# Patient Record
Sex: MALE | Race: WHITE | NOT HISPANIC OR LATINO | Employment: OTHER | URBAN - NONMETROPOLITAN AREA
[De-identification: names, ages, dates, MRNs, and addresses within clinical notes are randomized per-mention and may not be internally consistent; named-entity substitution may affect disease eponyms.]

---

## 2017-01-17 ENCOUNTER — TELEPHONE (OUTPATIENT)
Dept: OTOLARYNGOLOGY | Facility: CLINIC | Age: 82
End: 2017-01-17

## 2017-01-17 NOTE — TELEPHONE ENCOUNTER
Patient's wife called this am requesting appointment for patient. I returned his call this afternoon to offer appointment tomorrow 01/18/2017  Patient states his wife his sick and he is in the emergency room at [facility] with her at this time and he will call us back at another time to reschedule his appointment. Patient has sore area on ear that needs to be checked.

## 2017-01-23 ENCOUNTER — OFFICE VISIT (OUTPATIENT)
Dept: OTOLARYNGOLOGY | Facility: CLINIC | Age: 82
End: 2017-01-23

## 2017-01-23 VITALS
TEMPERATURE: 97.8 F | DIASTOLIC BLOOD PRESSURE: 88 MMHG | HEART RATE: 66 BPM | WEIGHT: 200 LBS | BODY MASS INDEX: 28.63 KG/M2 | SYSTOLIC BLOOD PRESSURE: 151 MMHG | RESPIRATION RATE: 20 BRPM | HEIGHT: 70 IN

## 2017-01-23 DIAGNOSIS — C44.219 BASAL CELL CARCINOMA OF LEFT EAR: Primary | ICD-10-CM

## 2017-01-23 PROCEDURE — 99214 OFFICE O/P EST MOD 30 MIN: CPT | Performed by: OTOLARYNGOLOGY

## 2017-01-23 RX ORDER — INFLUENZA A VIRUS A/MICHIGAN/45/2015 X-275 (H1N1) ANTIGEN (FORMALDEHYDE INACTIVATED), INFLUENZA A VIRUS A/SINGAPORE/INFIMH-16-0019/2016 IVR-186 (H3N2) ANTIGEN (FORMALDEHYDE INACTIVATED), AND INFLUENZA B VIRUS B/MARYLAND/15/2016 BX-69A (A B/COLORADO/6/2017-LIKE VIRUS) ANTIGEN (FORMALDEHYDE INACTIVATED) 60; 60; 60 UG/.5ML; UG/.5ML; UG/.5ML
INJECTION, SUSPENSION INTRAMUSCULAR
COMMUNITY
Start: 2016-10-21 | End: 2017-05-15

## 2017-01-23 RX ORDER — TIMOLOL MALEATE 5 MG/ML
1 SOLUTION/ DROPS OPHTHALMIC 2 TIMES DAILY
COMMUNITY
Start: 2016-12-07

## 2017-01-23 RX ORDER — ATORVASTATIN CALCIUM 20 MG/1
20 TABLET, FILM COATED ORAL DAILY
COMMUNITY
Start: 2016-12-06

## 2017-01-23 RX ORDER — LATANOPROST 50 UG/ML
1 SOLUTION/ DROPS OPHTHALMIC NIGHTLY
COMMUNITY
Start: 2016-12-06

## 2017-01-23 RX ORDER — LISINOPRIL AND HYDROCHLOROTHIAZIDE 20; 12.5 MG/1; MG/1
1 TABLET ORAL DAILY
COMMUNITY
Start: 2016-12-06

## 2017-01-23 RX ORDER — AMLODIPINE BESYLATE 5 MG/1
5 TABLET ORAL 2 TIMES DAILY
COMMUNITY
Start: 2016-12-06

## 2017-01-23 NOTE — PROGRESS NOTES
History of Present Illness  Anjum Peng complains of a skin lesion of the left posterior auricle . The lesion has been present for 6 months. The lesion has has changed. Symptoms associated with the lesion are: bleeding, tendency to be traumatized, pain, won't heal. Patient denies darkening color, itching, drainage, infection.    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 The new skin lesion is adjacent to the previous excision.     He states that a biopsy has not been performed.     Past Medical History   Diagnosis Date   • Basal cell carcinoma    • Basal cell carcinoma of left ear    • Heart attack    • Hypercholesteremia    • Hypertension      Past Surgical History   Procedure Laterality Date   • Basal cell carcinoma excision     • Cardiac surgery       bypass   • Partial hip arthroplasty     • Nose surgery       History reviewed. No pertinent family history.  Social History   Substance Use Topics   • Smoking status: Never Smoker   • Smokeless tobacco: None   • Alcohol use No     Allergies:  Review of patient's allergies indicates no known allergies.    Current Outpatient Prescriptions:   •  amLODIPine (NORVASC) 5 MG tablet, , Disp: , Rfl:   •  atorvastatin (LIPITOR) 20 MG tablet, , Disp: , Rfl:   •  FLUZONE HIGH-DOSE 0.5 ML suspension prefilled syringe injection, , Disp: , Rfl:   •  latanoprost (XALATAN) 0.005 % ophthalmic solution, , Disp: , Rfl:   •  lisinopril-hydrochlorothiazide (PRINZIDE,ZESTORETIC) 20-12.5 MG per tablet, , Disp: , Rfl:   •  timolol (TIMOPTIC) 0.5 % ophthalmic solution, , Disp: , Rfl:     Review of Systems   Constitutional: Negative for activity change, appetite change, chills, fatigue, fever and unexpected weight change.   HENT: Negative for congestion, dental problem, facial swelling and nosebleeds.    Eyes: Negative for discharge and redness.   Skin: Negative for color change, pallor and rash.   Hematological: Negative  "for adenopathy. Does not bruise/bleed easily.   All other systems reviewed and are negative.      Visit Vitals   • /88   • Pulse 66   • Temp 97.8 °F (36.6 °C)   • Resp 20   • Ht 70\" (177.8 cm)   • Wt 200 lb (90.7 kg)   • BMI 28.7 kg/m2       Physical Exam   Constitutional: He is oriented to person, place, and time. He appears well-developed and well-nourished. He is cooperative. No distress.   HENT:   Head: Normocephalic and atraumatic.   Right Ear: External ear normal.   Left Ear: External ear normal.   Nose: Nose normal.   Mouth/Throat: Oropharynx is clear and moist.   Eyes: Conjunctivae and EOM are normal. Pupils are equal, round, and reactive to light. Right eye exhibits no discharge. Left eye exhibits no discharge. No scleral icterus.   Neck: Normal range of motion and phonation normal. Neck supple. No tracheal deviation present.   Pulmonary/Chest: Effort normal. No stridor. No respiratory distress.   Musculoskeletal: Normal range of motion. He exhibits no edema or deformity.   Lymphadenopathy:     He has no cervical adenopathy.   Neurological: He is alert and oriented to person, place, and time. He has normal strength. No cranial nerve deficit. Coordination normal.   Skin: Skin is warm and dry. Lesion (erythematous lesion on the left posterior auricle-approximately 1.3 x 0.5 cm with small ulceration and scab) noted. No rash noted. He is not diaphoretic. No erythema. No pallor.   Psychiatric: He has a normal mood and affect. His speech is normal and behavior is normal. Judgment and thought content normal. Cognition and memory are normal.   Nursing note and vitals reviewed.            Anjum was seen today for follow-up.    Diagnoses and all orders for this visit:    Basal cell carcinoma of left ear  -     Skin Excision; Future      Photographs were taken today. Postoperative care instructions were given.     I do not feel this is a recurrence as it is not involving previous scar, but it is adjacent to it. "     Discussion of skin lesion. Discussed risks, benefits, alternatives, and possible complications of excision of the skin lesion with reconstruction utilizing local tissue rearrangement, full-thickness skin grafting, or local interpolated flaps. Risks include, but are not limited too: bleeding, infection, hematoma, recurrence, need for additional procedures, flap failure, cosmetic deformity. Patient understands risks and would like to proceed with surgery.     I have recommended excision of the carcinoma with frozen section analysis and reconstruction with linear closure in the office under local anesthesia.      Return for Postoperatively.    Jose Daniel Morel MD   01/23/17  12:18 PM

## 2017-01-23 NOTE — LETTER
January 23, 2017     Lucía Nick MD  [address] [facility] [id number]    Patient: Anjum Peng   YOB: 1935   Date of Visit: 1/23/2017       Dear Dr. Sandoval MD:    Thank you for referring Anjum Peng to me for evaluation. Below are the relevant portions of my assessment and plan of care.    If you have questions, please do not hesitate to call me. I look forward to following Anjum along with you.         Sincerely,        Jose Daniel Morel MD        CC: No Recipients  Jose Daniel Morel MD  1/23/2017  1:17 PM  Signed  History of Present Illness  Anjum Peng complains of a skin lesion of the left posterior auricle . The lesion has been present for 6 months. The lesion has has changed. Symptoms associated with the lesion are: bleeding, tendency to be traumatized, pain, won't heal. Patient denies darkening color, itching, drainage, infection.    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 The new skin lesion is adjacent to the previous excision.     He states that a biopsy has not been performed.     Past Medical History   Diagnosis Date   • Basal cell carcinoma    • Basal cell carcinoma of left ear    • Heart attack    • Hypercholesteremia    • Hypertension      Past Surgical History   Procedure Laterality Date   • Basal cell carcinoma excision     • Cardiac surgery       bypass   • Partial hip arthroplasty     • Nose surgery       History reviewed. No pertinent family history.  Social History   Substance Use Topics   • Smoking status: Never Smoker   • Smokeless tobacco: None   • Alcohol use No     Allergies:  Review of patient's allergies indicates no known allergies.    Current Outpatient Prescriptions:   •  amLODIPine (NORVASC) 5 MG tablet, , Disp: , Rfl:   •  atorvastatin (LIPITOR) 20 MG tablet, , Disp: , Rfl:   •  FLUZONE HIGH-DOSE 0.5 ML suspension prefilled syringe injection, , Disp: , Rfl:   •  latanoprost (XALATAN) 0.005 %  "ophthalmic solution, , Disp: , Rfl:   •  lisinopril-hydrochlorothiazide (PRINZIDE,ZESTORETIC) 20-12.5 MG per tablet, , Disp: , Rfl:   •  timolol (TIMOPTIC) 0.5 % ophthalmic solution, , Disp: , Rfl:     Review of Systems   Constitutional: Negative for activity change, appetite change, chills, fatigue, fever and unexpected weight change.   HENT: Negative for congestion, dental problem, facial swelling and nosebleeds.    Eyes: Negative for discharge and redness.   Skin: Negative for color change, pallor and rash.   Hematological: Negative for adenopathy. Does not bruise/bleed easily.   All other systems reviewed and are negative.      Visit Vitals   • /88   • Pulse 66   • Temp 97.8 °F (36.6 °C)   • Resp 20   • Ht 70\" (177.8 cm)   • Wt 200 lb (90.7 kg)   • BMI 28.7 kg/m2       Physical Exam   Constitutional: He is oriented to person, place, and time. He appears well-developed and well-nourished. He is cooperative. No distress.   HENT:   Head: Normocephalic and atraumatic.   Right Ear: External ear normal.   Left Ear: External ear normal.   Nose: Nose normal.   Mouth/Throat: Oropharynx is clear and moist.   Eyes: Conjunctivae and EOM are normal. Pupils are equal, round, and reactive to light. Right eye exhibits no discharge. Left eye exhibits no discharge. No scleral icterus.   Neck: Normal range of motion and phonation normal. Neck supple. No tracheal deviation present.   Pulmonary/Chest: Effort normal. No stridor. No respiratory distress.   Musculoskeletal: Normal range of motion. He exhibits no edema or deformity.   Lymphadenopathy:     He has no cervical adenopathy.   Neurological: He is alert and oriented to person, place, and time. He has normal strength. No cranial nerve deficit. Coordination normal.   Skin: Skin is warm and dry. Lesion (erythematous lesion on the left posterior auricle-approximately 1.3 x 0.5 cm with small ulceration and scab) noted. No rash noted. He is not diaphoretic. No erythema. No " pallor.   Psychiatric: He has a normal mood and affect. His speech is normal and behavior is normal. Judgment and thought content normal. Cognition and memory are normal.   Nursing note and vitals reviewed.            Anjum was seen today for follow-up.    Diagnoses and all orders for this visit:    Basal cell carcinoma of left ear  -     Skin Excision; Future      Photographs were taken today. Postoperative care instructions were given.     I do not feel this is a recurrence as it is not involving previous scar, but it is adjacent to it.     Discussion of skin lesion. Discussed risks, benefits, alternatives, and possible complications of excision of the skin lesion with reconstruction utilizing local tissue rearrangement, full-thickness skin grafting, or local interpolated flaps. Risks include, but are not limited too: bleeding, infection, hematoma, recurrence, need for additional procedures, flap failure, cosmetic deformity. Patient understands risks and would like to proceed with surgery.     I have recommended excision of the carcinoma with frozen section analysis and reconstruction with linear closure in the office under local anesthesia.      Return for Postoperatively.    Jose Daniel Morel MD   01/23/17  12:18 PM

## 2017-03-13 ENCOUNTER — PROCEDURE VISIT (OUTPATIENT)
Dept: OTOLARYNGOLOGY | Facility: CLINIC | Age: 82
End: 2017-03-13

## 2017-03-13 VITALS
HEART RATE: 82 BPM | HEIGHT: 70 IN | SYSTOLIC BLOOD PRESSURE: 145 MMHG | BODY MASS INDEX: 28.63 KG/M2 | TEMPERATURE: 97.8 F | WEIGHT: 200 LBS | RESPIRATION RATE: 20 BRPM | DIASTOLIC BLOOD PRESSURE: 76 MMHG

## 2017-03-13 DIAGNOSIS — C44.219: Primary | ICD-10-CM

## 2017-03-13 PROBLEM — C44.211 BASAL CELL CARCINOMA OF AURICLE OF EAR: Status: ACTIVE | Noted: 2017-03-13

## 2017-03-13 PROCEDURE — 88331 PATH CONSLTJ SURG 1 BLK 1SPC: CPT | Performed by: OTOLARYNGOLOGY

## 2017-03-13 PROCEDURE — 15260 FTH/GFT FR N/E/E/L 20 SQCM/<: CPT | Performed by: OTOLARYNGOLOGY

## 2017-03-13 PROCEDURE — 88305 TISSUE EXAM BY PATHOLOGIST: CPT | Performed by: OTOLARYNGOLOGY

## 2017-03-13 PROCEDURE — 11643 EXC F/E/E/N/L MAL+MRG 2.1-3: CPT | Performed by: OTOLARYNGOLOGY

## 2017-03-13 RX ORDER — CEPHALEXIN 500 MG/1
500 CAPSULE ORAL 4 TIMES DAILY
Qty: 28 CAPSULE | Refills: 0 | Status: SHIPPED | OUTPATIENT
Start: 2017-03-13 | End: 2017-05-15

## 2017-03-13 NOTE — PROGRESS NOTES
Preprocedure diagnosis: Basal cell carcinoma left posterior auricle     Post procedure diagnosis: Basal cell carcinoma left posterior auricle    Procedure performed: 1) Excision malignant neoplasm of skin of left posterior auricle, 2.3 cm x 2.3 cm     2) Full-thickness skin graft (left postauricular crease to left posterior auricle) 2.3cm x 2.3 cm     Surgeon: Jose Daniel Morel M.D.    Anesthesia: Local with 2 cc of 1% lidocaine with 1:100,000 epinephrine    Specimen:  Suspicious for recurrent basal cell carcinoma of the left ear - stitch at 12 o'clock     Complications: None    Disposition: Home    Details: After patient verification and informed consent was obtained, the skin was prepped with alcohol and infiltrated with 1% lidocaine with 1:100,000 epinephrine.  After approximately 10-15 minutes the skin was tested for anesthesia.  The skin was then sterilely prepped with Hibiclens and the patient was sterilely draped.    The skin surrounding the lesion was incised in fusiform fashion with a 15 blade beveling the incision laterally to facilitate wound eversion.  The skin was grasped and the lesion was removed from the underlying tissue utilizing sharp dissection with the 15 blade.  The specimen was oriented with a stitch at 12 o'clock and sent for frozen section analysis.  Hemostasis was obtained with bipolar cautery set at 12.      Frozen section demonstrated BCC with scar and likely superficial basal cell at the 9 o'clock margin.    An additonal margin was taken at 9 o'clock and oriented with a stitch at 9.  Frozen section demonstrated clear margins.    A skin graft was harvested from the left postauricular crease with a fresh 15 blade by incising the skin in fusiform fashion and undermining in the immediate subcutaneous plane, obtaining hemastasis, and releasing the surrounding skin to allow closure (1-2 cm in each direction).    The surrounding skin was undermined in the subcutaneous plane for approximate 1 cm  in each direction utilizing a fresh 15 blade.  Hemostasis was again obtained with bipolar cautery set at 12.  The tissue was advanced and closed utilizing 4-0 undyed Vicryl suture in buried fashion, followed by running, locking 5-0 fast absorbing gut suture.      The skin graft was thinned, trimmed, and inset with 4-0 silk sutures, leaving the suture long for the bolster.  I then rehan a running 5-0 fast absorbing gut along the periphery of the graft.  A bolster was affixed consisting of xeroform and bacitracin ointment soaked cotton.    Antibiotic ointment was placed to the incision.      The patient tolerated the procedure without any complication.

## 2017-03-14 LAB
CYTO UR: NORMAL
CYTO UR: NORMAL
LAB AP CASE REPORT: NORMAL
LAB AP CASE REPORT: NORMAL
LAB AP CLINICAL INFORMATION: NORMAL
LAB AP CLINICAL INFORMATION: NORMAL
Lab: NORMAL
PATH REPORT.FINAL DX SPEC: NORMAL
PATH REPORT.FINAL DX SPEC: NORMAL
PATH REPORT.GROSS SPEC: NORMAL
PATH REPORT.GROSS SPEC: NORMAL

## 2017-03-20 ENCOUNTER — OFFICE VISIT (OUTPATIENT)
Dept: OTOLARYNGOLOGY | Facility: CLINIC | Age: 82
End: 2017-03-20

## 2017-03-20 VITALS
HEART RATE: 60 BPM | RESPIRATION RATE: 20 BRPM | WEIGHT: 201 LBS | DIASTOLIC BLOOD PRESSURE: 85 MMHG | HEIGHT: 70 IN | SYSTOLIC BLOOD PRESSURE: 142 MMHG | TEMPERATURE: 98 F | BODY MASS INDEX: 28.77 KG/M2

## 2017-03-20 DIAGNOSIS — C44.219: Primary | ICD-10-CM

## 2017-03-20 PROCEDURE — 99024 POSTOP FOLLOW-UP VISIT: CPT | Performed by: OTOLARYNGOLOGY

## 2017-03-20 NOTE — PROGRESS NOTES
Anjum Peng presents for a routine postoperative visit following excision of a basal cell carcinoma of the left posterior auricle with full-thickness skin graft on 03/13/17.     Subjective: Since surgery, he is doing well without complaints.  Symptoms denied postoperatively include fever, chills, bleeding and drainage. The patient states the pain has decreased since surgery.     Objective:   Pathology review: Pathology is available. Pathology demonstrates a diagnosis of basal cell carcinoma with clear margins.         Physical Exam:  Left posterior auricle graft pink and healthy- bolster removed. Incisions healing well- sutures removed.       Assessment:  Anjum was seen today for post-op.    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 May briefly get graft wet in the shower. Do not rub, only pat dry.       Return in about 2 weeks (around 4/3/2017), or if symptoms worsen or fail to improve, for Recheck.      Jose Daniel Morel MD  03/20/17  12:04 PM

## 2017-04-03 ENCOUNTER — OFFICE VISIT (OUTPATIENT)
Dept: OTOLARYNGOLOGY | Facility: CLINIC | Age: 82
End: 2017-04-03

## 2017-04-03 VITALS
WEIGHT: 201 LBS | RESPIRATION RATE: 20 BRPM | DIASTOLIC BLOOD PRESSURE: 78 MMHG | HEIGHT: 71 IN | SYSTOLIC BLOOD PRESSURE: 154 MMHG | BODY MASS INDEX: 28.14 KG/M2 | HEART RATE: 59 BPM | TEMPERATURE: 97.8 F

## 2017-04-03 DIAGNOSIS — L57.0 ACTINIC KERATOSIS: ICD-10-CM

## 2017-04-03 DIAGNOSIS — C44.219 BASAL CELL CARCINOMA OF LEFT EAR: Primary | ICD-10-CM

## 2017-04-03 PROCEDURE — 99213 OFFICE O/P EST LOW 20 MIN: CPT | Performed by: OTOLARYNGOLOGY

## 2017-04-03 NOTE — PROGRESS NOTES
Anjum Peng presents for a routine postoperative visit following excision of a basal cell carcinoma of the left posterior auricle with full-thickness skin graft on 03/13/17.     Subjective: Since surgery, he is doing well without complaints.  Symptoms denied postoperatively include fever, chills, bleeding and drainage. The patient states the pain has decreased since surgery.     Objective:   Pathology review: Pathology is available. Pathology demonstrates a diagnosis of basal cell carcinoma with clear margins.     He also complains of a skin lesion of the left auricular lobule. The lesion has been present for a few months. Symptoms associated with the lesion are: waxes and wanes, rough, scaling. Patient denies darkening color, itching, bleeding, pain, drainage, infection. Nothing makes the lesion better or worse. A biopsy has not been performed.             Physical Exam:  Left posterior auricle graft healing well- central crusting removed. Left auricle lobule with crusting c/w AK      Assessment:  Anjum was seen today for post-op.    Diagnoses and all orders for this visit:    Basal cell carcinoma of left ear    Actinic keratosis        Plan:    Continue to apply vaseline. He may get the FTSG wet in the shower. Discussed options of treating AK on the left auricular lobule- he declines any treatment today because he has a wedding coming up. He does see Dr. Alfonso regularly. Follow-up in 6 weeks.    Return in about 6 weeks (around 5/15/2017), or if symptoms worsen or fail to improve, for Recheck.      Jose Daniel Morel MD  04/03/17  9:49 AM

## 2017-05-15 ENCOUNTER — OFFICE VISIT (OUTPATIENT)
Dept: OTOLARYNGOLOGY | Facility: CLINIC | Age: 82
End: 2017-05-15

## 2017-05-15 VITALS
BODY MASS INDEX: 27.86 KG/M2 | WEIGHT: 199 LBS | RESPIRATION RATE: 20 BRPM | HEIGHT: 71 IN | TEMPERATURE: 97.2 F | DIASTOLIC BLOOD PRESSURE: 74 MMHG | SYSTOLIC BLOOD PRESSURE: 135 MMHG | HEART RATE: 66 BPM

## 2017-05-15 DIAGNOSIS — L57.0 ACTINIC KERATOSIS: ICD-10-CM

## 2017-05-15 DIAGNOSIS — C44.219: Primary | ICD-10-CM

## 2017-05-15 PROCEDURE — 99024 POSTOP FOLLOW-UP VISIT: CPT | Performed by: OTOLARYNGOLOGY

## 2018-10-05 ENCOUNTER — OFFICE VISIT (OUTPATIENT)
Dept: CARDIOLOGY | Facility: CLINIC | Age: 83
End: 2018-10-05

## 2018-10-05 VITALS
RESPIRATION RATE: 14 BRPM | OXYGEN SATURATION: 95 % | DIASTOLIC BLOOD PRESSURE: 64 MMHG | SYSTOLIC BLOOD PRESSURE: 130 MMHG | HEIGHT: 71 IN | HEART RATE: 62 BPM | WEIGHT: 192.6 LBS | BODY MASS INDEX: 26.96 KG/M2

## 2018-10-05 DIAGNOSIS — I10 ESSENTIAL HYPERTENSION: ICD-10-CM

## 2018-10-05 DIAGNOSIS — I48.0 PAROXYSMAL ATRIAL FIBRILLATION (HCC): ICD-10-CM

## 2018-10-05 DIAGNOSIS — I25.10 CORONARY ARTERIOSCLEROSIS: Primary | ICD-10-CM

## 2018-10-05 DIAGNOSIS — I47.29 NONSUSTAINED VENTRICULAR TACHYCARDIA (HCC): ICD-10-CM

## 2018-10-05 DIAGNOSIS — E78.00 HYPERCHOLESTEREMIA: ICD-10-CM

## 2018-10-05 PROBLEM — I48.91 ATRIAL FIBRILLATION (HCC): Status: ACTIVE | Noted: 2018-10-05

## 2018-10-05 PROCEDURE — 93000 ELECTROCARDIOGRAM COMPLETE: CPT | Performed by: NURSE PRACTITIONER

## 2018-10-05 PROCEDURE — 99204 OFFICE O/P NEW MOD 45 MIN: CPT | Performed by: NURSE PRACTITIONER

## 2018-10-05 NOTE — ASSESSMENT & PLAN NOTE
Low burden. Continue anticoagulation but increase dose to 5 mg twice a day since creatinine 1.34 and wt greater than 60 kg. Call if palpitations worsen. Discussed need to decrease Eliquis dose if Cr greater than 1.5. Discussed risks of bleeding vs risk of stroke. Check nuclear stress test. May need to check echocardiogram as well.    [id number] 4 (High risk 4%/year)  HAS-BLED 3 (High risk 4.9-19.6%/year)

## 2018-10-05 NOTE — ASSESSMENT & PLAN NOTE
Check nuclear stress test. Has been 10 years since last ischemic evaluation. Encouraged him to start exercising again after stress test results known. Continue medications and healthy diet.

## 2018-10-05 NOTE — PROGRESS NOTES
Subjective:     Encounter Date:10/05/2018    Chief Complaint:    Patient ID: Anjum Peng is a 83 y.o. male here today for cardiac evaluation at the request of Do Turner NP and to re-establish cardiac care. He was previously followed in [place]. He is accompanied by his wife who is a retired oncology/outpatient surgery RN.    Atrial Fibrillation   Presents for initial visit. The condition has lasted for 1 month. Symptoms include chest pain (shooting pains), hypertension and palpitations. Symptoms are negative for dizziness, shortness of breath and weakness. The symptoms have been stable. Past treatments include anticoagulant. Past medical history includes atrial fibrillation, CABG/stent, CAD, HTN and hyperlipidemia. There is no history of CHF.   Coronary Artery Disease   Presents for initial visit. The disease course has been fluctuating. Symptoms include chest pain (shooting pains), leg swelling and palpitations. Pertinent negatives include no dizziness or shortness of breath. Risk factors include hyperlipidemia and hypertension. Past treatments include ACE inhibitors and statins. The treatment provided significant relief. Compliance with prior treatments has been good. His past medical history is significant for past myocardial infarction. There is no history of CHF. Past surgical history includes CABG. Past surgical history does not include angioplasty or cardiac stents. (His last cardiac stress test was approx 2008)     HPI     Atrial Fibrillation    Additional comments: He denies feeling palpitations and flutterings but had 10 seconds of atrial fibrillation on recent Holter           Nonsustained Ventricular Tachycardia    Additional comments: 3 beats on recent Holter monitor           Coronary Artery Disease    Additional comments: s/p MI 1977, CABG 1996, occasional chest discomfort like a shooting pain since having MI at rest and with activity        Last edited by My Stock APRN on  10/5/2018  8:58 AM. 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 3/13/2017   • CKD (chronic kidney disease), stage III (CMS/HCC)    • Cobalamin deficiency    • Coronary artery disease    • Glaucoma    • Heart attack (CMS/HCC) 1977   • Hypercholesteremia    • Hypertension    • Hyperuricemia    • Irregular heartbeat    • Ventricular premature complex    • Vitamin D deficiency      Past Surgical History:   Procedure Laterality Date   • APPENDECTOMY     • BASAL CELL CARCINOMA EXCISION     • CARDIAC CATHETERIZATION     • CARDIAC SURGERY      bypass   • CORONARY ARTERY BYPASS GRAFT  1996    x3    • NOSE SURGERY     • PARTIAL HIP ARTHROPLASTY     • TONSILLECTOMY       Social History     Social History   • Marital status:      Spouse name: N/A   • Number of children: N/A   • Years of education: N/A     Occupational History   • Not on file.     Social History Main Topics   • Smoking status: Former Smoker     Packs/day: 3.00     Types: Cigarettes     Start date: 1953     Quit date: 1977   • Smokeless tobacco: Never Used   • Alcohol use 8.4 oz/week     14 Shots of liquor per week      Comment: 2 a night, vodka   • Drug use: No      Comment: tried    • Sexual activity: Defer     Other Topics Concern   • Not on file     Social History Narrative   • No narrative on file     Family History   Problem Relation Age of Onset   • Hypertension Father    • Heart attack Father    • Heart disease Father    • Heart disease Sister    • Cancer Brother         thyroid   • Heart disease Brother    • Heart disease Brother        Outpatient Prescriptions Marked as Taking for the 10/5/18 encounter (Office Visit) with My Stock APRN   Medication Sig Dispense Refill   • amLODIPine (NORVASC) 5 MG tablet Take 5 mg by mouth 2 (Two) Times a Day.     • atorvastatin (LIPITOR) 20 MG  tablet Take 20 mg by mouth Daily.     • latanoprost (XALATAN) 0.005 % ophthalmic solution Administer 1 drop to both eyes Every Night.     • lisinopril-hydrochlorothiazide (PRINZIDE,ZESTORETIC) 20-12.5 MG per tablet Take 1 tablet by mouth Daily.     • Mirabegron ER (MYRBETRIQ) 50 MG tablet sustained-release 24 hour 24 hr tablet Take 50 mg by mouth Every Other Day.     • omeprazole (priLOSEC) 40 MG capsule Take 40 mg by mouth Daily.     • timolol (TIMOPTIC) 0.5 % ophthalmic solution Administer 1 drop into the left eye 2 (Two) Times a Day.     • [DISCONTINUED] apixaban (ELIQUIS) 2.5 MG tablet tablet Take 2.5 mg by mouth Every 12 (Twelve) Hours.       Review of Systems:  Review of Systems   Constitution: Positive for decreased appetite and malaise/fatigue. Negative for chills, diaphoresis and weakness.   HENT: Negative for congestion and nosebleeds.    Eyes: Negative for blurred vision and discharge.   Cardiovascular: Positive for chest pain (shooting pains), irregular heartbeat, leg swelling and palpitations. Negative for dyspnea on exertion.   Respiratory: Negative for cough and shortness of breath.    Endocrine: Positive for cold intolerance. Negative for heat intolerance.   Hematologic/Lymphatic: Bruises/bleeds easily.   Skin: Negative for dry skin and itching.   Musculoskeletal: Positive for arthritis. Negative for back pain, falls, muscle cramps, neck pain and stiffness.   Gastrointestinal: Positive for diarrhea (IBS). Negative for abdominal pain, constipation, melena, nausea and vomiting.   Genitourinary: Positive for nocturia (4-5). Negative for frequency.   Neurological: Positive for loss of balance. Negative for dizziness, focal weakness, light-headedness and numbness.   Psychiatric/Behavioral: Negative for depression and memory loss. The patient does not have insomnia and is not nervous/anxious.           Objective:   /64 (BP Location: Right arm, Patient Position: Sitting, Cuff Size: Adult)   Pulse 62   " Resp 14   Ht 180.3 cm (71\")   Wt 87.4 kg (192 lb 9.6 oz)   SpO2 95%   BMI 26.86 kg/m²   Wt Readings from Last 3 Encounters:   10/05/18 87.4 kg (192 lb 9.6 oz)   05/15/17 90.3 kg (199 lb)   04/03/17 91.2 kg (201 lb)         Physical Exam   Constitutional: He is oriented to person, place, and time. He appears well-developed and well-nourished.   HENT:   Head: Normocephalic and atraumatic.   Right Ear: External ear normal.   Left Ear: External ear normal.   Nose: Nose normal.   Mouth/Throat: Oropharynx is clear and moist.   Eyes: Pupils are equal, round, and reactive to light. Conjunctivae and EOM are normal. Right eye exhibits no discharge. Left eye exhibits no discharge. No scleral icterus.   Neck: Normal range of motion. Neck supple. No JVD present. No tracheal deviation present. No thyromegaly present.   Cardiovascular: Normal rate and regular rhythm.  Exam reveals no gallop and no friction rub.    No murmur heard.  Pulmonary/Chest: Effort normal and breath sounds normal. He has no wheezes. He has no rales.   Abdominal: Soft. Bowel sounds are normal. He exhibits no mass. There is no tenderness. There is no rebound and no guarding.   Musculoskeletal: He exhibits edema (trace LLE, 1+ pitting RLE). He exhibits no tenderness or deformity.   Lymphadenopathy:     He has no cervical adenopathy.   Neurological: He is alert and oriented to person, place, and time. No cranial nerve deficit.   Skin: Skin is warm and dry.   Mediastinal chest incision well healed, LLE SVG incision well healed   Psychiatric: He has a normal mood and affect. His behavior is normal. Judgment and thought content normal.   Vitals reviewed.      Lab/Diagnostics Review:   09/04/2018 24-hour Holter monitor sinus rhythm with single brief episode of nonsustained ventricular tachycardia for 3 beats occasional episodes of SVT with paroxysmal atrial fibrillation up to 10 beats in duration.    09/05/2018  CBC WBC 10,800, hemoglobin 14.7, hematocrit " 44.4%, platelets 213,000.  BMP sodium 137, potassium 3.8, chloride 104, CO2 25.9, BUN 20, creatinine 1.34, calcium 9.1, glucose 96    08/27/2018 EKG sinus rhythm 67 bpm with occasional PVC, nonspecific ST and T-wave abnormality      ECG 12 Lead  Date/Time: 10/5/2018 9:01 AM  Performed by: LEONEL LIN  Authorized by: LEONEL LIN   Comparison: compared with previous ECG from 9/4/2018  Similar to previous ECG  Rhythm: sinus rhythm  Ectopy: multifocal PVCs  Rate: normal  BPM: 63  Clinical impression: abnormal ECG                Assessment/Plan:         Problem List Items Addressed This Visit        Cardiovascular and Mediastinum    Coronary arteriosclerosis - Primary    Current Assessment & Plan     Check nuclear stress test. Has been 10 years since last ischemic evaluation. Encouraged him to start exercising again after stress test results known. Continue medications and healthy diet.          Relevant Orders    ECG 12 Lead    Stress Test With Myocardial Perfusion One Day    Atrial fibrillation (CMS/HCC)    Overview     paroxysmal, very low burden per Holter, on Eliquis         Current Assessment & Plan     Low burden. Continue anticoagulation but increase dose to 5 mg twice a day since creatinine 1.34 and wt greater than 60 kg. Call if palpitations worsen. Discussed need to decrease Eliquis dose if Cr greater than 1.5. Discussed risks of bleeding vs risk of stroke. Check nuclear stress test. May need to check echocardiogram as well.    [id number] 4 (High risk 4%/year)  HAS-BLED 3 (High risk 4.9-19.6%/year)         Relevant Medications    apixaban (ELIQUIS) 5 MG tablet tablet    Other Relevant Orders    Stress Test With Myocardial Perfusion One Day    Hypertension    Current Assessment & Plan     Controlled with medications.          Hypercholesteremia    Current Assessment & Plan     Unknown control, requested and will review most recent lipids         Nonsustained ventricular tachycardia (CMS/HCC)     Relevant Orders    Stress Test With Myocardial Perfusion One Day          Return in about 6 months (around 4/5/2019) for Recheck.           My Stock APRN, ACNP-BC, CHFN-BC

## 2018-10-05 NOTE — PATIENT INSTRUCTIONS
Atrial Fibrillation  Atrial fibrillation is a type of heartbeat that is irregular or fast (rapid). If you have this condition, your heart keeps quivering in a weird (chaotic) way. This condition can make it so your heart cannot pump blood normally. Having this condition gives a person more risk for stroke, heart failure, and other heart problems. There are different types of atrial fibrillation. Talk with your doctor to learn about the type that you have.  Follow these instructions at home:  · Take over-the-counter and prescription medicines only as told by your doctor.  · If your doctor prescribed a blood-thinning medicine, take it exactly as told. Taking too much of it can cause bleeding. If you do not take enough of it, you will not have the protection that you need against stroke and other problems.  · Do not use any tobacco products. These include cigarettes, chewing tobacco, and e-cigarettes. If you need help quitting, ask your doctor.  · If you have apnea (obstructive sleep apnea), manage it as told by your doctor.  · Do not drink alcohol.  · Do not drink beverages that have caffeine. These include coffee, soda, and tea.  · Maintain a healthy weight. Do not use diet pills unless your doctor says they are safe for you. Diet pills may make heart problems worse.  · Follow diet instructions as told by your doctor.  · Exercise regularly as told by your doctor.  · Keep all follow-up visits as told by your doctor. This is important.  Contact a doctor if:  · You notice a change in the speed, rhythm, or strength of your heartbeat.  · You are taking a blood-thinning medicine and you notice more bruising.  · You get tired more easily when you move or exercise.  Get help right away if:  · You have pain in your chest or your belly (abdomen).  · You have sweating or weakness.  · You feel sick to your stomach (nauseous).  · You notice blood in your throw up (vomit), poop (stool), or pee (urine).  · You are short of  breath.  · You suddenly have swollen feet and ankles.  · You feel dizzy.  · Your suddenly get weak or numb in your face, arms, or legs, especially if it happens on one side of your body.  · You have trouble talking, trouble understanding, or both.  · Your face or your eyelid droops on one side.  These symptoms may be an emergency. Do not wait to see if the symptoms will go away. Get medical help right away. Call your local emergency services (911 in the [place] Do not drive yourself to the hospital.  This information is not intended to replace advice given to you by your health care provider. Make sure you discuss any questions you have with your health care provider.  Document Released: 09/26/2009 Document Revised: 05/25/2017 Document Reviewed: 04/13/2016  Elsevier Interactive Patient Education © 2018 [employer]

## 2018-11-09 ENCOUNTER — TELEPHONE (OUTPATIENT)
Dept: CARDIOLOGY | Facility: CLINIC | Age: 83
End: 2018-11-09

## 2018-11-09 NOTE — TELEPHONE ENCOUNTER
Pt came in and said that My wanted him to start taking 5mg instead of 2.5mg of Eliquis.  Please send 90 day supply to [facility].

## 2018-11-12 ENCOUNTER — TELEPHONE (OUTPATIENT)
Dept: CARDIOLOGY | Facility: CLINIC | Age: 83
End: 2018-11-12

## 2018-11-12 DIAGNOSIS — I48.0 PAROXYSMAL ATRIAL FIBRILLATION (HCC): ICD-10-CM

## 2018-11-12 NOTE — TELEPHONE ENCOUNTER
His nuclear stress test results from last month were not routed to me for review. Staff notified and investigating system process.     He has fixed inferolateral defect and some rizwan-infarct lateral ischemia. Discussed results with patient - no recommendation for heart cath at this time - minimal symptoms and no large area of reversible ischemia.      Ok to hold Eliquis 48 to 72 hours (PAF, low afib burden) before planned eye surgery Thursday but recommended patient check with Dr. Robertson to see if ok to take an 81 mg aspirin while off Eliquis due to hx of CAD. Clearance letter sent to Dr. Robertson.

## 2018-11-12 NOTE — TELEPHONE ENCOUNTER
Brook from  is calling to get cardiac clearance on this pt for glaucoma procedure this Thursday. He is taking Eliquis. Please fax last EKG along with cardiac clearance.    Phone [phone] [phone]  Fax [phone]